# Patient Record
Sex: MALE | Race: WHITE | NOT HISPANIC OR LATINO | Employment: UNEMPLOYED | URBAN - METROPOLITAN AREA
[De-identification: names, ages, dates, MRNs, and addresses within clinical notes are randomized per-mention and may not be internally consistent; named-entity substitution may affect disease eponyms.]

---
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
## 2024-03-08 ENCOUNTER — APPOINTMENT (OUTPATIENT)
Dept: MRI IMAGING | Facility: CLINIC | Age: 24
End: 2024-03-08
Attending: EMERGENCY MEDICINE
Payer: MEDICAID

## 2024-03-08 ENCOUNTER — HOSPITAL ENCOUNTER (EMERGENCY)
Facility: CLINIC | Age: 24
Discharge: HOME | End: 2024-03-08
Attending: EMERGENCY MEDICINE | Admitting: EMERGENCY MEDICINE
Payer: MEDICAID

## 2024-03-08 VITALS
DIASTOLIC BLOOD PRESSURE: 77 MMHG | SYSTOLIC BLOOD PRESSURE: 139 MMHG | RESPIRATION RATE: 17 BRPM | TEMPERATURE: 98.4 F | HEART RATE: 69 BPM

## 2024-03-08 DIAGNOSIS — R20.2 PARESTHESIAS: ICD-10-CM

## 2024-03-08 LAB
ANION GAP SERPL CALCULATED.3IONS-SCNC: 10 MMOL/L (ref 7–15)
BUN SERPL-MCNC: 13.1 MG/DL (ref 6–20)
CALCIUM SERPL-MCNC: 9.5 MG/DL (ref 8.6–10)
CHLORIDE SERPL-SCNC: 105 MMOL/L (ref 98–107)
CREAT SERPL-MCNC: 1.01 MG/DL (ref 0.67–1.17)
DEPRECATED HCO3 PLAS-SCNC: 26 MMOL/L (ref 22–29)
EGFRCR SERPLBLD CKD-EPI 2021: >90 ML/MIN/1.73M2
GLUCOSE SERPL-MCNC: 111 MG/DL (ref 70–99)
POTASSIUM SERPL-SCNC: 3.9 MMOL/L (ref 3.4–5.3)
SODIUM SERPL-SCNC: 141 MMOL/L (ref 135–145)

## 2024-03-08 PROCEDURE — 99285 EMERGENCY DEPT VISIT HI MDM: CPT | Mod: 25 | Performed by: EMERGENCY MEDICINE

## 2024-03-08 PROCEDURE — A9585 GADOBUTROL INJECTION: HCPCS | Performed by: EMERGENCY MEDICINE

## 2024-03-08 PROCEDURE — 255N000002 HC RX 255 OP 636: Performed by: EMERGENCY MEDICINE

## 2024-03-08 PROCEDURE — 70549 MR ANGIOGRAPH NECK W/O&W/DYE: CPT

## 2024-03-08 PROCEDURE — 36415 COLL VENOUS BLD VENIPUNCTURE: CPT | Performed by: EMERGENCY MEDICINE

## 2024-03-08 PROCEDURE — 99284 EMERGENCY DEPT VISIT MOD MDM: CPT | Performed by: EMERGENCY MEDICINE

## 2024-03-08 PROCEDURE — 72156 MRI NECK SPINE W/O & W/DYE: CPT

## 2024-03-08 PROCEDURE — 72156 MRI NECK SPINE W/O & W/DYE: CPT | Mod: 26 | Performed by: RADIOLOGY

## 2024-03-08 PROCEDURE — 80048 BASIC METABOLIC PNL TOTAL CA: CPT | Performed by: EMERGENCY MEDICINE

## 2024-03-08 PROCEDURE — 70546 MR ANGIOGRAPH HEAD W/O&W/DYE: CPT

## 2024-03-08 PROCEDURE — 70549 MR ANGIOGRAPH NECK W/O&W/DYE: CPT | Mod: 26 | Performed by: RADIOLOGY

## 2024-03-08 RX ORDER — GADOBUTROL 604.72 MG/ML
7 INJECTION INTRAVENOUS ONCE
Status: COMPLETED | OUTPATIENT
Start: 2024-03-08 | End: 2024-03-08

## 2024-03-08 RX ORDER — IBUPROFEN 600 MG/1
600 TABLET, FILM COATED ORAL EVERY 6 HOURS PRN
Qty: 20 TABLET | Refills: 0 | Status: SHIPPED | OUTPATIENT
Start: 2024-03-08 | End: 2024-05-14

## 2024-03-08 RX ADMIN — GADOBUTROL 7 ML: 604.72 INJECTION INTRAVENOUS at 03:42

## 2024-03-08 ASSESSMENT — COLUMBIA-SUICIDE SEVERITY RATING SCALE - C-SSRS
2. HAVE YOU ACTUALLY HAD ANY THOUGHTS OF KILLING YOURSELF IN THE PAST MONTH?: NO
1. IN THE PAST MONTH, HAVE YOU WISHED YOU WERE DEAD OR WISHED YOU COULD GO TO SLEEP AND NOT WAKE UP?: NO
6. HAVE YOU EVER DONE ANYTHING, STARTED TO DO ANYTHING, OR PREPARED TO DO ANYTHING TO END YOUR LIFE?: NO

## 2024-03-08 ASSESSMENT — ACTIVITIES OF DAILY LIVING (ADL)
ADLS_ACUITY_SCORE: 35

## 2024-03-08 NOTE — ED PROVIDER NOTES
"    [place] EMERGENCY DEPARTMENT ([facility])    3/08/24       ED PROVIDER NOTE   Vertical Triage A   History     Chief Complaint   Patient presents with    Neck Pain     The history is provided by the patient and medical records.     Mani Grossman is a 24 year old male with history of PTSD on medical marijuana, distant history of polysubstance use disorder (alcohol, prescription opioids, amphetamines, barbiturates, alcohol, psychedelics) with 3 years of sobriety in setting of incarceration from 1850-3523 who presents with complaints of numbness and tingling in the low back and left leg.  He states that 2 nights ago he manually \"cracked\" his neck by jarring his chin to the right suddenly.  He states that as his neck cracked he felt a \"pop,\" and suddenly felt a numbness and tingling sensation in his low back and radiating down his left leg.  No weakness.  No loss of bowel or bladder control.  He states that symptoms have waxed and waned since then.  He states he has a little bit of discomfort in the left side of his neck and he feels that there is a bulge there, but no back discomfort, no pain anywhere else.  No other trauma or injuries.  No fever or cough.  No chest discomfort.  No other acute complaints.    This part of the document was transcribed by Alyssa Carlos, Medical Scribe.      Past Medical History  Past Medical History:   Diagnosis Date    Anxiety     Depression     Drug abuse (H)     FAS (fetal alcohol syndrome)     Oppositional defiant disorder      History reviewed. No pertinent surgical history.  ibuprofen (ADVIL/MOTRIN) 600 MG tablet  buPROPion (WELLBUTRIN XL) 300 MG 24 hr tablet  FLUoxetine (PROZAC) 20 MG capsule  folic acid (FOLVITE) 1 MG tablet  multivitamin, therapeutic (THERA-VIT) TABS tablet  naltrexone (DEPADE/REVIA) 50 MG tablet  nicotine (NICODERM CQ) 21 MG/24HR 24 hr patch  thiamine (THIAMINE) 100 MG tablet      No Known Allergies  Family History  History reviewed. No pertinent " family history.  Social History   Social History     Tobacco Use    Smoking status: Never   Substance Use Topics    Alcohol use: Yes     Alcohol/week: 0.0 standard drinks of alcohol     Comment: varied alcohol use    Drug use: Yes     Types: LSD     Comment: last used 4/26/2020         A medically appropriate review of systems was performed with pertinent positives and negatives noted in the HPI, and all other systems negative.    Physical Exam   BP: 139/77  Pulse: 69  Temp: 98.4  F (36.9  C)  Resp: 17  Physical Exam  Constitutional:       General: He is not in acute distress.     Appearance: Normal appearance. He is not toxic-appearing.   HENT:      Head: Atraumatic.   Eyes:      General: No scleral icterus.     Conjunctiva/sclera: Conjunctivae normal.   Neck:     Cardiovascular:      Rate and Rhythm: Normal rate.      Heart sounds: Normal heart sounds.   Pulmonary:      Effort: Pulmonary effort is normal. No respiratory distress.      Breath sounds: Normal breath sounds.   Abdominal:      Palpations: Abdomen is soft.      Tenderness: There is no abdominal tenderness.   Musculoskeletal:         General: No deformity.      Cervical back: Neck supple.   Skin:     General: Skin is warm.   Neurological:      General: No focal deficit present.      Mental Status: He is alert and oriented to person, place, and time.      Cranial Nerves: No cranial nerve deficit.      Sensory: No sensory deficit.      Motor: No weakness.      Coordination: Coordination normal.           ED Course, Procedures, & Data     ED Course as of 03/08/24 0436   Fri Mar 08, 2024   0123 Case discussed with Radiology.     Procedures              Results for orders placed or performed during the hospital encounter of 03/08/24   MR Cervical Spine w/o & w Contrast     Status: None    Narrative    EXAM: MR CERVICAL SPINE W/O and W CONTRAST  LOCATION: [facility]  DATE: 3/8/2024    INDICATION: cracked neck and  now having numbness tingling down left back and left leg  COMPARISON: None.  CONTRAST: 7ml gadavist  TECHNIQUE: MRI Cervical Spine without and with IV contrast.    FINDINGS:   Normal vertebral body heights, alignment and marrow signal. No abnormal cord signal. No extraspinal abnormality.    Craniovertebral junction and C1-C2: Normal.    C2-C3 through C7-T1: Normal disc height. No herniation. Normal facets. No spinal canal or neural foraminal stenosis.       Impression    IMPRESSION:  1.  Unremarkable cervical spine MRI.     MRA Neck (Carotids) w/o & w Contrast     Status: None    Narrative    EXAM: MRA NECK (CAROTIDS) W/O and W CONTRAST  LOCATION: [facility]  DATE: 3/8/2024    INDICATION: left back and leg numbness tingling after cracking neck   please include dissection protocol  COMPARISON: None.  CONTRAST: 7ml gadavist  TECHNIQUE: Neck MRA without and with IV contrast. Stenosis measurements made according to NASCET criteria unless otherwise specified.    FINDINGS:  RIGHT CAROTID: No measurable stenosis or dissection.    LEFT CAROTID: No measurable stenosis or dissection.    VERTEBRAL ARTERIES: No focal stenosis or dissection. Balanced vertebral arteries.    AORTIC ARCH: Classic aortic arch anatomy with no significant stenosis at the origin of the great vessels.      Impression    IMPRESSION:  1.  Normal neck MRA.   MRA Brain ([place] of [place]) wo & w Contrast     Status: None    Narrative    EXAM: MRA BRAIN ([place] OF [place]) W/O and W CONTRAST  LOCATION: [facility]  DATE: 3/8/2024    INDICATION: left back and leg numbness tingling after cracking neck  COMPARISON: None.  TECHNIQUE: 3D time-of-flight head MRA without intravenous contrast.    FINDINGS:  ANTERIOR CIRCULATION: No stenosis/occlusion, aneurysm, or high flow vascular malformation. Standard [place] of [place] anatomy.    POSTERIOR CIRCULATION: No stenosis/occlusion,  aneurysm, or high flow vascular malformation. Dominant left and smaller right vertebral artery contribute to a normal basilar artery.       Impression    IMPRESSION:  1.  No aneurysm, high flow AVM or significant stenosis identified.   Basic metabolic panel     Status: Abnormal   Result Value Ref Range    Sodium 141 135 - 145 mmol/L    Potassium 3.9 3.4 - 5.3 mmol/L    Chloride 105 98 - 107 mmol/L    Carbon Dioxide (CO2) 26 22 - 29 mmol/L    Anion Gap 10 7 - 15 mmol/L    Urea Nitrogen 13.1 6.0 - 20.0 mg/dL    Creatinine 1.01 0.67 - 1.17 mg/dL    GFR Estimate >90 >60 mL/min/1.73m2    Calcium 9.5 8.6 - 10.0 mg/dL    Glucose 111 (H) 70 - 99 mg/dL     Medications   gadobutrol (GADAVIST) injection 7 mL (7 mLs Intravenous $Given 3/8/24 6879)     Labs Ordered and Resulted from Time of ED Arrival to Time of ED Departure   BASIC METABOLIC PANEL - Abnormal       Result Value    Sodium 141      Potassium 3.9      Chloride 105      Carbon Dioxide (CO2) 26      Anion Gap 10      Urea Nitrogen 13.1      Creatinine 1.01      GFR Estimate >90      Calcium 9.5      Glucose 111 (*)      MRA Neck (Carotids) w/o & w Contrast   Final Result   IMPRESSION:   1.  Normal neck MRA.      MR Cervical Spine w/o & w Contrast   Final Result   IMPRESSION:   1.  Unremarkable cervical spine MRI.         MRA Brain ([place]) wo & w Contrast   Final Result   IMPRESSION:   1.  No aneurysm, high flow AVM or significant stenosis identified.             Critical care was not performed.     Medical Decision Making  The patient's presentation was of moderate complexity (an acute illness with systemic symptoms).    The patient's evaluation involved:  review of external note(s) from 1 sources (previous note)  ordering and/or review of 3+ test(s) in this encounter (see separate area of note for details)    The patient's management necessitated moderate risk (prescription drug management including medications given in the ED).    Assessment & Plan     The patient presented with sudden onset numbness and tingling after manually cracking his neck a couple of nights ago.  His exam is completely unremarkable-reported normal and equal sensation bilaterally with testing, strength was normal and equal as well.  I did speak with the radiologist who felt that we could use MRI to both evaluate the spinal cord as well as the cervical vessels.  No sign of dissection, no sign of any cord injury or issue.  He has no midline tenderness, and I do not suspect fracture.  He is tender on the left side in the paraspinal musculature, perhaps there are some slight fullness in the area.  I do think he has muscle spasm.  This could be contributing to his symptoms to some degree.  Symptoms are intermittent, he states that they go away at times, and seem positional.  I do not think he had a stroke.  BMP was not remarkable.  I do think he is safe for discharge home.  I do not think there is an acute neurologic or vascular emergency at this time.  No evidence for infectious etiology.  I will prescribe ibuprofen, ask him to follow-up with primary care next week.  He is encouraged to return with any new or worsening symptoms, any other concerns.  He verbalizes understanding and is agreeable to the plan.    Dictation Disclaimer: Some of this Note has been completed with voice-recognition dictation software. Although errors are generally corrected real-time, there is the potential for a rare error to be present in the completed chart.      I have reviewed the nursing notes. I have reviewed the findings, diagnosis, plan and need for follow up with the patient.    New Prescriptions    IBUPROFEN (ADVIL/MOTRIN) 600 MG TABLET    Take 1 tablet (600 mg) by mouth every 6 hours as needed for moderate pain       Final diagnoses:   Paresthesias       Laxmi Whitney MD   [facility] EMERGENCY DEPARTMENT  3/8/2024     Laxmi Whitney MD  03/08/24 9038

## 2024-03-08 NOTE — DISCHARGE INSTRUCTIONS
Use the ibuprofen as prescribed. Follow up with your clinic doctor next week. Return with any worsening or concerns.

## 2024-03-08 NOTE — ED TRIAGE NOTES
Pt came into the ER due to cracking his neck 2 days ago and he felt a pop. The lower back and left leg started to have positional numbness.  Pt denies vision changes and headache.

## 2024-03-28 ENCOUNTER — HOSPITAL ENCOUNTER (EMERGENCY)
Facility: CLINIC | Age: 24
Discharge: LEFT BEFORE DISCHARGE | End: 2024-03-28
Admitting: EMERGENCY MEDICINE
Payer: MEDICAID

## 2024-03-28 VITALS
HEART RATE: 160 BPM | OXYGEN SATURATION: 96 % | SYSTOLIC BLOOD PRESSURE: 124 MMHG | TEMPERATURE: 98.2 F | WEIGHT: 160 LBS | DIASTOLIC BLOOD PRESSURE: 101 MMHG | RESPIRATION RATE: 20 BRPM | BODY MASS INDEX: 22.96 KG/M2

## 2024-03-28 LAB
ATRIAL RATE - MUSE: 132 BPM
DIASTOLIC BLOOD PRESSURE - MUSE: NORMAL MMHG
INTERPRETATION ECG - MUSE: NORMAL
P AXIS - MUSE: 78 DEGREES
PR INTERVAL - MUSE: 144 MS
QRS DURATION - MUSE: 82 MS
QT - MUSE: 298 MS
QTC - MUSE: 441 MS
R AXIS - MUSE: 72 DEGREES
SYSTOLIC BLOOD PRESSURE - MUSE: NORMAL MMHG
T AXIS - MUSE: 71 DEGREES
VENTRICULAR RATE- MUSE: 132 BPM

## 2024-03-28 PROCEDURE — 93005 ELECTROCARDIOGRAM TRACING: CPT | Mod: RTG

## 2024-03-28 PROCEDURE — 99281 EMR DPT VST MAYX REQ PHY/QHP: CPT

## 2024-03-28 ASSESSMENT — COLUMBIA-SUICIDE SEVERITY RATING SCALE - C-SSRS
2. HAVE YOU ACTUALLY HAD ANY THOUGHTS OF KILLING YOURSELF IN THE PAST MONTH?: NO
6. HAVE YOU EVER DONE ANYTHING, STARTED TO DO ANYTHING, OR PREPARED TO DO ANYTHING TO END YOUR LIFE?: NO
1. IN THE PAST MONTH, HAVE YOU WISHED YOU WERE DEAD OR WISHED YOU COULD GO TO SLEEP AND NOT WAKE UP?: NO

## 2024-03-28 NOTE — ED TRIAGE NOTES
"Pt c/o numbness to L arm. Pt states he had an IV 2 weeks ago and he now has \"blood shooting through his arm\" and that it feels like it goes from where the IV was placed to his finger tips. Pt is continuously moving arm around and shaking it and reports it feels better when he does this. Pt endorses taking meth tonight. Pt movements are fast and animated.    Pt had MRI done on 3/8 for numbness to L side.              Triage Assessment (Adult)       Row Name 03/28/24 0059          Triage Assessment    Airway WDL WDL        Respiratory WDL    Respiratory WDL WDL        Cardiac WDL    Pulse Rate & Regularity tachycardic        Cognitive/Neuro/Behavioral WDL    Cognitive/Neuro/Behavioral WDL WDL                     "

## 2024-04-16 ENCOUNTER — HOSPITAL ENCOUNTER (EMERGENCY)
Facility: CLINIC | Age: 24
Discharge: HOME | End: 2024-04-16
Attending: EMERGENCY MEDICINE | Admitting: EMERGENCY MEDICINE
Payer: MEDICAID

## 2024-04-16 VITALS
RESPIRATION RATE: 18 BRPM | WEIGHT: 160 LBS | HEART RATE: 93 BPM | TEMPERATURE: 97.2 F | BODY MASS INDEX: 22.4 KG/M2 | DIASTOLIC BLOOD PRESSURE: 71 MMHG | SYSTOLIC BLOOD PRESSURE: 120 MMHG | HEIGHT: 71 IN

## 2024-04-16 DIAGNOSIS — F41.9 ANXIETY: ICD-10-CM

## 2024-04-16 PROCEDURE — 99283 EMERGENCY DEPT VISIT LOW MDM: CPT | Performed by: EMERGENCY MEDICINE

## 2024-04-16 PROCEDURE — 99281 EMR DPT VST MAYX REQ PHY/QHP: CPT | Performed by: EMERGENCY MEDICINE

## 2024-04-16 ASSESSMENT — COLUMBIA-SUICIDE SEVERITY RATING SCALE - C-SSRS
1. IN THE PAST MONTH, HAVE YOU WISHED YOU WERE DEAD OR WISHED YOU COULD GO TO SLEEP AND NOT WAKE UP?: NO
6. HAVE YOU EVER DONE ANYTHING, STARTED TO DO ANYTHING, OR PREPARED TO DO ANYTHING TO END YOUR LIFE?: NO
2. HAVE YOU ACTUALLY HAD ANY THOUGHTS OF KILLING YOURSELF IN THE PAST MONTH?: NO
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
## 2024-04-16 NOTE — DISCHARGE INSTRUCTIONS
Please make an appointment to follow up with [facility] (phone: [phone])  if you have any concerns.
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
## 2024-05-03 ENCOUNTER — HOSPITAL ENCOUNTER (EMERGENCY)
Facility: CLINIC | Age: 24
Discharge: HOME | End: 2024-05-03
Attending: EMERGENCY MEDICINE | Admitting: EMERGENCY MEDICINE
Payer: MEDICAID

## 2024-05-03 DIAGNOSIS — K13.0 MUCOCELE OF LIP: ICD-10-CM

## 2024-05-03 PROCEDURE — 99283 EMERGENCY DEPT VISIT LOW MDM: CPT | Performed by: EMERGENCY MEDICINE

## 2024-05-03 PROCEDURE — 99282 EMERGENCY DEPT VISIT SF MDM: CPT | Performed by: EMERGENCY MEDICINE

## 2024-05-03 NOTE — ED TRIAGE NOTES
Triage Assessment (Adult)       Row Name 05/03/24 0428          Triage Assessment    Airway WDL WDL        Respiratory WDL    Respiratory WDL WDL        Skin Circulation/Temperature WDL    Skin Circulation/Temperature WDL WDL        Cardiac WDL    Cardiac WDL WDL        Peripheral/Neurovascular WDL    Peripheral Neurovascular WDL WDL        Cognitive/Neuro/Behavioral WDL    Cognitive/Neuro/Behavioral WDL WDL

## 2024-05-03 NOTE — ED TRIAGE NOTES
BIBA, Per EMS, Pt has a sore in the inside of its lip and has been anxious regarding it. Pt has hx of depression, anxiety.

## 2024-05-03 NOTE — ED PROVIDER NOTES
ED Provider Note  [facility]      History     Chief Complaint   Patient presents with    Anxiety    Sore     HPI  Mani Grossman is a 24 year old male who has medical history of anxiety presenting with a lesion on his lip.  Noticed it tonight.  Patient denies ever having this before.  It does not really hurt but it is bothering him.  No fevers.  No vomiting.  He is eating and drinking normally.  No swelling of his tongue, throat.    Past Medical History  Past Medical History:   Diagnosis Date    Anxiety     Depression     Drug abuse (H)     FAS (fetal alcohol syndrome)     Oppositional defiant disorder      History reviewed. No pertinent surgical history.  buPROPion (WELLBUTRIN XL) 300 MG 24 hr tablet  FLUoxetine (PROZAC) 20 MG capsule  folic acid (FOLVITE) 1 MG tablet  ibuprofen (ADVIL/MOTRIN) 600 MG tablet  multivitamin, therapeutic (THERA-VIT) TABS tablet  naltrexone (DEPADE/REVIA) 50 MG tablet  nicotine (NICODERM CQ) 21 MG/24HR 24 hr patch  thiamine (THIAMINE) 100 MG tablet      No Known Allergies  Family History  History reviewed. No pertinent family history.  Social History   Social History     Tobacco Use    Smoking status: Never   Substance Use Topics    Alcohol use: Yes     Alcohol/week: 0.0 standard drinks of alcohol     Comment: varied alcohol use    Drug use: Yes     Types: LSD     Comment: last used 4/26/2020         A medically appropriate review of systems was performed with pertinent positives and negatives noted in the HPI, and all other systems negative.    Physical Exam      Physical Exam  Physical Exam   Constitutional: oriented to person, place, and time. appears well-developed and well-nourished.   HENT:   Head: Normocephalic and atraumatic.  There is a 2 mm domed cystic structure on the mucosal surface of the lower lip with apparent clear fluid, no surrounding erythema.  No submandibular swelling.  No significant erythema.  Tongue is not elevated.  Posterior pharynx  normal.  Neck: Normal range of motion.   Pulmonary/Chest: Effort normal. No respiratory distress.   Cardiac: No murmurs, rubs, gallops. RRR.  Abdominal: Abdomen soft, nontender, nondistended. No rebound tenderness.  MSK: Long bones without deformity or evidence of trauma  Neurological: alert and oriented to person, place, and time.   Skin: Skin is warm and dry.   Psychiatric:  normal mood and affect.  behavior is normal. Thought content normal.       ED Course, Procedures, & Data      Procedures            No results found for any visits on 05/03/24.  Medications - No data to display  Labs Ordered and Resulted from Time of ED Arrival to Time of ED Departure - No data to display  No orders to display          Critical care was not performed.     Medical Decision Making  The patient's presentation was of low complexity (2+ clearly self-limited or minor problems).    The patient's evaluation involved:  history and exam without other [facility] data elements    The patient's management necessitated moderate risk (limitations due to social determinants of health (mental health concerns)).    Assessment & Plan    MDM  Patient presenting with lesion on his lower lip.  This is likely a mucocele or a canker sore.  No signs of infection.  The patient was adamant that I remove it.  I discussed that I do not feel it is necessary to cause any more trauma to the lower lip as this will probably cause him more discomfort.  Discussed that he can see a dentist or primary care provider for further help she did not go away however this is quite benign and likely will rupture on its own.  Patient does seem quite worked up about this and is not accepting of the benign nature.  I did offer him mental health help but he declined this.  The patient will be discharged.  Tried to reassure the patient that this is quite benign and that it would likely be gone in a week.  He knows he can return if he has any further concerns.    I have reviewed the  nursing notes. I have reviewed the findings, diagnosis, plan and need for follow up with the patient.    New Prescriptions    No medications on file       Final diagnoses:   Mucocele of lip       Harley Loera  [facility] EMERGENCY DEPARTMENT  5/3/2024     Harley Loera MD  05/03/24 0448

## 2024-05-03 NOTE — DISCHARGE INSTRUCTIONS
You have a benign lesion in your mouth.  This will likely pop and go away on its own.  If it does not he can follow-up with primary care provider they can potentially freeze it or you can see a dentist for removal    Please make an appointment to follow up with [facility] (phone: [phone]) and [facility] (phone: [phone])  if you have any concerns.

## 2024-05-13 ENCOUNTER — HOSPITAL ENCOUNTER (OUTPATIENT)
Facility: CLINIC | Age: 24
Setting detail: OBSERVATION
Discharge: HOME | End: 2024-05-14
Attending: EMERGENCY MEDICINE | Admitting: STUDENT IN AN ORGANIZED HEALTH CARE EDUCATION/TRAINING PROGRAM
Payer: MEDICAID

## 2024-05-13 DIAGNOSIS — F22 PARANOIA (H): ICD-10-CM

## 2024-05-13 DIAGNOSIS — F15.20 METHAMPHETAMINE DEPENDENCE (H): ICD-10-CM

## 2024-05-13 DIAGNOSIS — F15.259: Primary | ICD-10-CM

## 2024-05-13 PROCEDURE — 99223 1ST HOSP IP/OBS HIGH 75: CPT | Performed by: STUDENT IN AN ORGANIZED HEALTH CARE EDUCATION/TRAINING PROGRAM

## 2024-05-13 PROCEDURE — 99285 EMERGENCY DEPT VISIT HI MDM: CPT | Mod: 25 | Performed by: STUDENT IN AN ORGANIZED HEALTH CARE EDUCATION/TRAINING PROGRAM

## 2024-05-13 PROCEDURE — 250N000013 HC RX MED GY IP 250 OP 250 PS 637: Performed by: EMERGENCY MEDICINE

## 2024-05-13 PROCEDURE — G0378 HOSPITAL OBSERVATION PER HR: HCPCS

## 2024-05-13 RX ORDER — OLANZAPINE 10 MG/1
10 TABLET, ORALLY DISINTEGRATING ORAL ONCE
Status: COMPLETED | OUTPATIENT
Start: 2024-05-13 | End: 2024-05-13

## 2024-05-13 RX ADMIN — OLANZAPINE 10 MG: 10 TABLET, ORALLY DISINTEGRATING ORAL at 16:59

## 2024-05-13 ASSESSMENT — ACTIVITIES OF DAILY LIVING (ADL)
ADLS_ACUITY_SCORE: 35

## 2024-05-13 NOTE — ED TRIAGE NOTES
Triage Assessment (Adult)       Row Name 05/13/24 3857          Triage Assessment    Airway WDL WDL        Respiratory WDL    Respiratory WDL WDL        Skin Circulation/Temperature WDL    Skin Circulation/Temperature WDL WDL        Cardiac WDL    Cardiac WDL WDL        Peripheral/Neurovascular WDL    Peripheral Neurovascular WDL WDL        Cognitive/Neuro/Behavioral WDL    Cognitive/Neuro/Behavioral WDL WDL
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
## 2024-05-14 VITALS
OXYGEN SATURATION: 97 % | SYSTOLIC BLOOD PRESSURE: 120 MMHG | TEMPERATURE: 97.6 F | RESPIRATION RATE: 18 BRPM | DIASTOLIC BLOOD PRESSURE: 70 MMHG | HEART RATE: 77 BPM

## 2024-05-14 PROCEDURE — G0378 HOSPITAL OBSERVATION PER HR: HCPCS

## 2024-05-14 PROCEDURE — 99254 IP/OBS CNSLTJ NEW/EST MOD 60: CPT

## 2024-05-14 PROCEDURE — 99238 HOSP IP/OBS DSCHRG MGMT 30/<: CPT | Performed by: EMERGENCY MEDICINE

## 2024-05-14 RX ORDER — OLANZAPINE 5 MG/1
5 TABLET ORAL AT BEDTIME
COMMUNITY

## 2024-05-14 RX ORDER — OLANZAPINE 5 MG/1
5 TABLET ORAL AT BEDTIME
Qty: 30 TABLET | Refills: 0 | Status: SHIPPED | OUTPATIENT
Start: 2024-05-14

## 2024-05-14 ASSESSMENT — ACTIVITIES OF DAILY LIVING (ADL)
ADLS_ACUITY_SCORE: 35

## 2024-05-14 ASSESSMENT — COLUMBIA-SUICIDE SEVERITY RATING SCALE - C-SSRS
SUICIDE, SINCE LAST CONTACT: NO
TOTAL  NUMBER OF ABORTED OR SELF INTERRUPTED ATTEMPTS SINCE LAST CONTACT: NO
6. HAVE YOU EVER DONE ANYTHING, STARTED TO DO ANYTHING, OR PREPARED TO DO ANYTHING TO END YOUR LIFE?: NO
ATTEMPT SINCE LAST CONTACT: NO
TOTAL  NUMBER OF INTERRUPTED ATTEMPTS SINCE LAST CONTACT: NO
2. HAVE YOU ACTUALLY HAD ANY THOUGHTS OF KILLING YOURSELF?: NO
1. SINCE LAST CONTACT, HAVE YOU WISHED YOU WERE DEAD OR WISHED YOU COULD GO TO SLEEP AND NOT WAKE UP?: NO

## 2024-05-14 NOTE — DISCHARGE SUMMARY
ED Observation Discharge Summary  [facility]  Discharge Date: 5/14/2024    Mani Grossman MRN: [id number]   Age: 24 year old YOB: 2000     Brief HPI & Initial ED Course     Chief Complaint   Patient presents with    Drug / Alcohol Assessment     Patient states he is seeking detox from Meth. Patient states he snorts it and sometimes eats it. Last use was Saturday and has been using it for 4-5 months.      HPI  Mani Grossman is a 24 year old male with PMH notable for polysubstance abuse, ODD and FAS who presented to the ED with altered mental status. Initial history was limited due to altered mental status. The patient arrived with altered mental status with reason to suspect alcohol or other drug intoxication as etiology, and an exam that was without findings suggestive of trauma. Pt has a hx of Methamphetamine abuse.     Upon being evaluated in the emergency department, the patient was found to have a condition that would benefit from ongoing monitoring. Observation care was initiated with plan for close clinical monitoring of the patient and his mental status for clearing of intoxicating substance, and broadening of the work-up if not clearing appropriately or if other indications develop.     See ED Observation H&P for further details on the patient's presenting history and initial evaluation.     Physical Exam   BP: 112/65  Pulse: 109  Temp: 98.6  F (37  C)  Resp: 18  SpO2: 98 %    Physical Exam  General: no acute distress. Alert.   HENT: MMM. Atraumatic head.   Eyes: PERRL, normal sclerae   Neck: non-tender, supple    Resp: Normal work of breathing, normal respiratory rate    Neuro: alert, fully oriented. Steady gait. CN II-XII grossly intact. Grossly normal strength and sensation.   Integumentary/Skin: no rash visualized, normal color    Results      Procedures                  Labs Ordered and Resulted from Time of ED Arrival to Time of ED Departure - No data to display          Observation Course   The patient was admitted to observation status with plan for close clinical monitoring of the patient and his mental status for clearing of intoxicating substance, and broadening of the work-up if not clearing appropriately or if other indications develop.     Serial assessments of the patient's mental status were performed. Nursing notes were reviewed. During the observation period, the patient did not require medications for agitation, and did not require restraints/seclusion for patient and/or provider safety.         With monitoring, patient's mental status cleared. Patient then clinically sober with steady gait and tolerating PO. Normalization of mental status with sobering makes other causes of altered mental status very unlikely.     After counseling on the diagnosis, work-up, and treatment plan, the patient was discharged. Recommended safe cessation of EtOH/drugs and provided information on community treatment resources. Patient to follow-up with primary care in the coming days for recheck and further cessation counseling. Patient to return to the ED if any urgent or potentially life-threatening concerns.    She was seen by both extended care and by the substance abuse navigator.  Patient has resources to follow-up [facility].  Patient was seen by the psychiatric NP as well.  No reason for needing inpatient admission at this time.  Patient stable for discharge    Discharge Diagnoses:   Final diagnoses:   Paranoia (H)   Methamphetamine dependence (H)       --  Cierra Payan MD  [facility] EMERGENCY DEPARTMENT  5/14/2024

## 2024-05-14 NOTE — H&P
ED Observation History and Physical  [facility]  Observation Initiation Date: May 13, 2024    Mani Grossman MRN: [id number]   Age: 24 year old YOB: 2000     History     Chief Complaint   Patient presents with    Drug / Alcohol Assessment     Patient states he is seeking detox from Meth. Patient states he snorts it and sometimes eats it. Last use was Saturday and has been using it for 4-5 months.      HPI  Mani Grossman is a 24 year old male with PMH notable for MARCELL, MDD, PTSD  who presented to the ED with disordered thinking.           Review of Systems      Physical Exam   BP: 112/65  Pulse: 109  Temp: 98.6  F (37  C)  Resp: 18  SpO2: 98 %    Physical Exam  General: . Appears stated age.   HENT: MMM, no oropharyngeal lesions  Eyes: PERRL, normal sclerae   Cardio: Regular rate, extremities well perfused  Resp: Normal work of breathing, normal respiratory rate  Neuro: alert and fully oriented. CN II-XII grossly intact. Grossly normal strength and sensation in all extremities.   MSK: no deformities.   Integumentary/Skin: no rash visualized, normal color    ED Course      Procedures                         Labs Ordered and Resulted from Time of ED Arrival to Time of ED Departure - No data to display         Assessments & Plan (with Medical Decision Making)   Patient presenting with disordered thinking. Nursing notes reviewed.     DEC assessment completed with  recommending obs overnight for psych assessment in am. See separate DEC note from today's date for details on the assessment.    During the observation period, the patient did not require medications for agitation, and did not require restraints/seclusion for patient and/or provider safety.     The patient was found to have a psychiatric condition that would benefit from an observation stay in the emergency department for further psychiatric stabilization and/or coordination of a safe disposition. The observation plan  includes serial assessments of psychiatric condition, potential administration of medications if indicated, further disposition pending the patient's psychiatric course during the monitoring period.     Preliminary diagnosis:  Disordered thinking    --  Dov Tavares MD   [facility] EMERGENCY DEPARTMENT  5/13/2024

## 2024-05-14 NOTE — CONSULTS
"Diagnostic Evaluation Consultation  Crisis Assessment    Patient Name: Mani Grossman  Age:  24 year old  Legal Sex: male  Gender Identity: male  Pronouns:   Race: White  Ethnicity: Not  or   Language: English      Patient was assessed: Virtual: iPad Crisis Assessment Start Time: 1730 Crisis Assessment Stop Time: 1800  Patient location: [facility] EMERGENCY DEPARTMENT                             [facility]    Referral Data and Chief Complaint  Mani Grossman presents to the ED with family/friends (pt brought by dad). Patient is presenting to the ED for the following concerns: Substance use, Paranoia.   Factors that make the mental health crisis life threatening or complex are:  Pt struggling with ongoing meth use and recent difficulties with paranoia.      Informed Consent and Assessment Methods  Explained the crisis assessment process, including applicable information disclosures and limits to confidentiality, assessed understanding of the process, and obtained consent to proceed with the assessment.  Assessment methods included conducting a formal interview with patient, review of medical records, collaboration with medical staff, and obtaining relevant collateral information from family and community providers when available.  : done     Patient response to interventions: eager to participate, acceptance expressed  Coping skills were attempted to reduce the crisis:  pt came into ED     History of the Crisis   Patient is a 24-year-old male with a history of PTSD, generalized anxiety disorder, agitation and methamphetamine abuse who comes in the hospital brought in by his father due to concerns of ongoing methamphetamine abuse and concerns of paranoia.  Patient reports that has been using meth for the last few months and is trying to get off of it.  He reports he has been dealing with \"some type of psychosis\" and increasing body discomfort.  He reports that his body is been feeling strange and is " "feeling distrustful of the world around him.  He reports that he \"does not feel like myself\" and is paranoid of his family and feels that someone might be following him but he is not sure who that might be.  He feels that paranoia is daily and makes it hard to do anything on a day-to-day basis.      He reports last using meth this past Saturday and often will use it daily.  He feels that he can tell people in front of him or actual people or whether they are visual hallucinations.  He also reports tactile hallucinations of feeling as though things are poking his skin. He's been having difficulty with organizing his thoughts and a recent rule 25 completed for CD treatment. He endorses feelings of high anxiety, nervous, feeling on edge, feelings of sadness, and wondering whether \"I can ever get better.\" He reports passive thoughts of SI in the past, but denies any plan or intent for self harm.    Brief Psychosocial History  Family:  Single, Children no  Support System:  Parent(s)  Employment Status:  unemployed  Source of Income:  none  Financial Environmental Concerns:  none  Current Hobbies:  other (see comments) (\"None right now\")  Barriers in Personal Life:       Significant Clinical History  Current Anxiety Symptoms:     Current Depression/Trauma:  sadness, hopelessness, helplessness  Current Somatic Symptoms:     Current Psychosis/Thought Disturbance:  tactile hallucinations, visual hallucinations  Current Eating Symptoms:     Chemical Use History:  Alcohol: None  Benzodiazepines: None  Opiates: None  Cocaine: None  Marijuana: Occasional  Other Use: Methamphetamines (using meth daily)  Last Use:: 05/11/24   Past diagnosis:  Anxiety Disorder, Substance Use Disorder  Family history:  No known history of mental health or chemical health concerns  Past treatment:  AA/NA, Psychiatric Medication Management, Individual therapy  Details of most recent treatment:  Pt reports around six previous CD treatments, with last " treatment around August 2023.  Other relevant history:          Collateral Information  Is there collateral information: No     Collateral information name, relationship, phone number:  Arthur Grossman, Father,  [phone] (attempted to contact father, left VM for him to call back)    What happened today: Pt has been acting more paranoid lately feeling that others are out to get him and trying to hurt him. Dad notes they were not aware the pt was abusing meth up until yesterday. Dad notes the pt has been acting more erratic lately where the pt went to help his sister move things out of her garage and showed up using a hammer and an ice car . Dad does not feel he is mentally stable and while he does wish to get help, pt elected to come to the hospital to get well before he feels comfortable gonig to treatment.     What is different about patient's functioning: Pt has been acting more paranoid and disorganized thinking.     Concern about alcohol/drug use:  yes, ongoing meth use    What do you think the patient needs:      Has patient made comments about wanting to kill themselves/others: yes (he's said in the past that he would be better off dead)    If d/c is recommended, can they take part in safety/aftercare planning:  no    Additional collateral information:        Risk Assessment  [place] Suicide Severity Rating Scale Full Clinical Version:  Suicidal Ideation  Q1 Wish to be Dead (Lifetime): Yes  Q2 Non-Specific Active Suicidal Thoughts (Lifetime): No  3. Active Suicidal Ideation with any Methods (Not Plan) Without Intent to Act (Lifetime): No  Q4 Active Suicidal Ideation with Some Intent to Act, Without Specific Plan (Lifetime): No  Q5 Active Suicidal Ideation with Specific Plan and Intent (Lifetime): No  Q6 Suicide Behavior (Lifetime): no     Suicidal Behavior (Lifetime)  Actual Attempt (Lifetime): No  Has subject engaged in non-suicidal self-injurious behavior? (Lifetime): No  Interrupted Attempts  (Lifetime): No  Aborted or Self-Interrupted Attempt (Lifetime): No  Preparatory Acts or Behavior (Lifetime): No    [place] Suicide Severity Rating Scale Recent:   Suicidal Ideation (Recent)  Q1 Wished to be Dead (Past Month): no  Q2 Suicidal Thoughts (Past Month): no  Level of Risk per Screen: no risks indicated  Intensity of Ideation (Recent)  Most Severe Ideation Rating (Past 1 Month): 1  Frequency (Past 1 Month): Less than once a week  Duration (Past 1 Month): Fleeting, few seconds or minutes  Controllability (Past 1 Month): Easily able to control thoughts  Deterrents (Past 1 Month): Deterrents definitely did not stop you  Reasons for Ideation (Past 1 Month): Does not apply  Suicidal Behavior (Recent)  Actual Attempt (Past 3 Months): No  Has subject engaged in non-suicidal self-injurious behavior? (Past 3 Months): No  Interrupted Attempts (Past 3 Months): No  Aborted or Self-Interrupted Attempt (Past 3 Months): No  Preparatory Acts or Behavior (Past 3 Months): No    Environmental or Psychosocial Events: unemployment/underemployment, ongoing abuse of substances, neither working nor attending school  Protective Factors: Protective Factors: lives in a responsibly safe and stable environment, help seeking    Does the patient have thoughts of harming others? Feels Like Hurting Others: no  Previous Attempt to Hurt Others: no  Is the patient engaging in sexually inappropriate behavior?: no    Is the patient engaging in sexually inappropriate behavior?  no        Mental Status Exam   Affect: Appropriate  Appearance: Appropriate  Attention Span/Concentration: Attentive  Eye Contact: Engaged    Fund of Knowledge: Appropriate   Language /Speech Content: Fluent  Language /Speech Volume: Normal  Language /Speech Rate/Productions: Normal  Recent Memory: Intact  Remote Memory: Intact  Mood: Anxious  Orientation to Person: Yes   Orientation to Place: Yes  Orientation to Time of Day: Yes  Orientation to Date: Yes     Situation  (Do they understand why they are here?): Yes  Psychomotor Behavior: Normal  Thought Content: Paranoia, Hallucinations  Thought Form: Intact     Mini-Cog Assessment  Number of Words Recalled:    Clock-Drawing Test:     Three Item Recall:    Mini-Cog Total Score:       Medication  Psychotropic medications:   Medication Orders - Psychiatric (From admission, onward)      None             Current Care Team  Patient Care Team:  Hilaria Weeks NP as PCP - General (Nurse Practitioner)  Juan Judd MD as MD (Pediatric Cardiology)    Diagnosis  Patient Active Problem List   Diagnosis Code    Agitation requiring sedation protocol R45.1    Agitation R45.1    Overdose of sympathomimetic agent, undetermined intent, initial encounter T44.904A    Suicidal ideation R45.851    Alcohol withdrawal syndrome without complication (H) F10.930    Fetal alcohol spectrum disorder (H28) P04.3    PTSD (post-traumatic stress disorder) F43.10    Unspecified alcohol use disorder F10.99    Unspecified nicotine use disorder F17.200    Major depressive disorder, recurrent episode, moderate (H) F33.1    MARCELL (generalized anxiety disorder) F41.1    Cannabis use disorder, unspecified F12.90    Paranoia (H) F22    Methamphetamine dependence (H) F15.20    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  Patient admits to ongoing meth use that has been quite consistent over the last few months which has led to the patient experiencing increasing paranoia and difficulty understanding reality.  Patient last used meth 2 days ago and shares reports about having paranoia and fears around dandelions  chasing him and general paranoia/fear around what other people's intensions are. He feels that he is unable to determine whether people in front of him are actual people or whether he is hallucinating. He's also been struggling with tactile halluciations which has resulted in him with severe skin picking. Pt recently completed a rule 25 with the hopes to getting into [facility] for treatment. Pt initially met with Dr. Tirado who discussed pt remaining in the hospital overnight for observation, starting Zyprexa medication to bring mental clearing, with hopes of being able to DC tomorrow to attend CD treatment. Pt is in agreement with current plan and will meet with extended care in the morning on 5/14 to determine pt's readiness to discharge to [facility].          Patient coping skills attempted to reduce the crisis:  pt came into ED    Disposition  Recommended disposition: Observation        Reviewed case and recommendations with attending provider. Attending Name: Dr. Tavares       Attending concurs with disposition: yes       Patient and/or validated legal guardian concurs with disposition:   yes       Final disposition:  observation    Legal status on admission:      Assessment Details   Total duration spent with the patient: 30 min     CPT code(s) utilized: 25255 - Psychotherapy for Crisis - 60 (30-74*) min    Kendall Chisholm Psychotherapist  [facility] - Triage & Transition Services  Callback: [phone]

## 2024-05-14 NOTE — MEDICATION SCRIBE - ADMISSION MEDICATION HISTORY
Medication Scribe Admission Medication History    Admission medication history is complete. The information provided in this note is only as accurate as the sources available at the time of the update.    Information Source(s): Patient, Hospital records, and CareEverywhere/SureScripts via in-person    Pertinent Information: patient reports a recent prescription for olanzapine was filled but he has not started taking it, he reports he does not have a pharmacy..    Changes made to PTA medication list:  Added: olanzapine 5 mg at bedtime.  Deleted: bupropion 300 mg Q am, fluoxetine 20 mg daily, folic acid 1 mg daily, ibuprofen 600 mg Q 6 hrs PRN, MVI 1 tablet daily, naltrexone 50 mg daily, nicotine 21 mg/24 hr, 1 patch daily, thiamine 100 mg daily, gabapentin 300 mg.  Changed: None    Allergies reviewed with patient and updates made in EHR: yes    Medication History Completed By: Jonatan Singh MD 5/14/2024 11:44 AM    PTA Med List   Medication Sig Last Dose    OLANZapine (ZYPREXA) 5 MG tablet Take 1 tablet (5 mg) by mouth at bedtime     OLANZapine (ZYPREXA) 5 MG tablet Take 5 mg by mouth at bedtime Unknown at has not started

## 2024-05-14 NOTE — CONSULTS
Met with patient to discuss possible treatment options and to possibly complete an assessment. Patient states he is planning on going to [facility] and has recently completed an assessment and has everything set up.  Patient declined any assistance from this staff related to [facility] services or assistance.    Maykel Tirado [facility] on 5/14/2024 at 9:19 AM

## 2024-05-14 NOTE — PROGRESS NOTES
"Triage and Transition Services Extended Care Reassessment     Patient: Mani goes by \"Mani,\" uses he/him pronouns  Date of Service: May 14, 2024  Site of Service: [facility] EMERGENCY DEPARTMENT                             [facility]  Patient was seen yes  Mode of Assessment: Virtual: iPad     Reason for Reassessment: significant behavior change    History of Patient's Original Emergency Room Encounter: Patient is a 24-year-old male with a history of PTSD, generalized anxiety disorder, agitation and methamphetamine abuse who comes in the hospital brought in by his father due to concerns of ongoing methamphetamine abuse and concerns of paranoia.  Patient reports that has been using meth for the last few months and is trying to get off of it.  He reports he has been dealing with \"some type of psychosis\" and increasing body discomfort.  He reports that his body is been feeling strange and is feeling distrustful of the world around him.  He reports that he \"does not feel like myself\" and is paranoid of his family and feels that someone might be following him but he is not sure who that might be.  He feels that paranoia is daily and makes it hard to do anything on a day-to-day basis.  He reports last using meth this past Saturday and often will use it daily.  He feels that he can tell people in front of him or actual people or whether they are visual hallucinations.  He also reports tactile hallucinations of feeling as though things are poking his skin. He's been having difficulty with organizing his thoughts and a recent rule 25 completed for CD treatment. He endorses feelings of high anxiety, nervous, feeling on edge, feelings of sadness, and wondering whether \"I can ever get better.\" He reports passive thoughts of SI in the past, but denies any plan or intent for self harm.    Current Patient Presentation: Writer met Mr. Grossman today. He denies SI, HI, NSSIB or panchito. Notes \"I don't know\" when asked about " "symptoms fo psychosis. He is minimal in his response noting high fatigue, and could be associated with substance withdrawal. He is open to discussion about admission to [facility] door to door if possible. He is also okay with discharge home if needed if door to door cannot be arranged due to availability. Preference for location of [place] or [facility].    Presentation Summary: Writer met Mr. Grossman today. He denies SI, HI, NSSIB or panchito. Notes \"I don't know\" when asked about symptoms fo psychosis. He is minimal in his response noting high fatigue, and could be associated with substance withdrawal. He is open to discussion about admission to [facility] door to door if possible. He is also okay with discharge home if needed if door to door cannot be arranged due to availability. Preference for location of [facility].    Changes Observed Since Initial Assessment: decrease in presenting symptoms    Therapeutic Interventions Provided: Engaged in safety planning, Taught the link between thoughts, feelings, and behaviors., Explored motivation for behavioral change.    Current Symptoms:    (sleeping/fatigue)          Mental Status Exam   Affect: Appropriate  Appearance: Appropriate  Attention Span/Concentration: Attentive  Eye Contact: Engaged    Fund of Knowledge: Appropriate   Language /Speech Content: Fluent  Language /Speech Volume: Soft  Language /Speech Rate/Productions: Minimally Responsive  Recent Memory: Intact  Remote Memory: Intact  Mood: Normal  Orientation to Person: Yes   Orientation to Place: Yes  Orientation to Time of Day: Yes  Orientation to Date: Yes     Situation (Do they understand why they are here?): Yes  Psychomotor Behavior: Normal  Thought Content: Clear  Thought Form: Intact    Treatment Objective(s) Addressed: rapport building, identifying and practicing coping strategies, processing feelings, assessing safety, identifying treatment goals, identifying additional supports    Patient Response to " Interventions: acceptance expressed, verbalizes understanding    Progress Towards Goals:  Patient Reports Symptoms Are: stable  Patient Progress Toward Goals: is making progress (stable)  Next Step to Work Toward Discharge: symptom stabilization, follow up on referrals  Symptom Stabilization Comment: [facility] [phone]  Symptom Stabilization Comment: Psychiatry recommended medication for treatment in OP setting    Case Management: Case Management Included: completing or following up on referrals  Details on completing or following up on referrals: JOE Schwartz [phone] (- Kyleigh)  Summary of Interaction: Writer spoke to Kyleigh whom reports the intake interview needs to be completed and additional information is needed and that door to door will not be likely, however patient would be able to follow up and start care after speaking to her further withing a few days to a week.    C-SSRS Since Last Contact:   1. Wish to be Dead (Since Last Contact): No  2. Non-Specific Active Suicidal Thoughts (Since Last Contact): No     Actual Attempt (Since Last Contact): No  Has subject engaged in non-suicidal self-injurious behavior? (Since Last Contact): No  Interrupted Attempts (Since Last Contact): No  Aborted or Self-Interrupted Attempt (Since Last Contact): No  Preparatory Acts or Behavior (Since Last Contact): No  Suicide (Since Last Contact): No     Calculated C-SSRS Risk Score (Since Last Contact): No Risk Indicated    Plan: Final Disposition / Recommended Care Path: discharge  Plan for Care reviewed with assigned Medical Provider: yes  Plan for Care Team Review: provider, RN, other (see comment) (Psychiatry and [facility])  Comments: Dr. Payan  Patient and/or validated legal guardian concurs: yes  Clinical Substantiation: Pt comes into the ED due to increasing confusion, disorganized thinking and paranoia. Patient admits to ongoing meth use that has been quite consistent over the last few  "months which has led to the patient experiencing increasing paranoia and difficulty understanding reality. Patient last used meth 2 days ago and shares reports about having paranoia and fears around dandelions chasing him and general paranoia/fear around what other people's intensions are. He feels that he is unable to determine whether people in front of him are actual people or whether he is hallucinating. He's also been struggling with tactile halluciations which has resulted in him with severe skin picking. Pt recently completed a rule 25 with the hopes to getting into [facility] for treatment. Pt is in agreement with current plan. He denies SI, HI, NSSIB or panchito. Notes \"I don't know\" when asked about symptoms fo psychosis. He is minimal in his response noting high fatigue, and could be associated with substance withdrawal. He is open to discussion about admission to [facility] after follow up is completed by him with Kyleigh from [facility].    Legal Status: Legal Status at Admission: Voluntary/Patient has signed consent for treatment    Session Status: Time session started: 1030  Time session ended: 1035  Session Duration (minutes): 5 minutes  Session Number: 0  Anticipated number of sessions or this episode of care: 1    Session Start Time: 1030  Session Stop Time: 1035  CPT codes: Non-Billable  Time Spent: 5 minutes      CPT code(s) utilized: Non-Billable    Diagnosis:   Patient Active Problem List   Diagnosis Code    Agitation requiring sedation protocol R45.1    Agitation R45.1    Overdose of sympathomimetic agent, undetermined intent, initial encounter T44.904A    Suicidal ideation R45.851    Alcohol withdrawal syndrome without complication (H) F10.930    Fetal alcohol spectrum disorder (H28) P04.3    PTSD (post-traumatic stress disorder) F43.10    Unspecified alcohol use disorder F10.99    Unspecified nicotine use disorder F17.200    Major depressive disorder, recurrent episode, moderate (H) F33.1    MARCELL " (generalized anxiety disorder) F41.1    Cannabis use disorder, unspecified F12.90    Paranoia (H) F22    Methamphetamine dependence (H) F15.20    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

## 2024-05-14 NOTE — CONSULTS
Initial Psychiatric Consult   Consult date: May 14, 2024         Reason for Consult, requesting source:    Recs  Requesting source: Cierra Payan    Labs and imaging reviewed. Patient seen and evaluated by RANDOLPH Calix CNP          HPI:   Mani Grossman is a 24 year old male with history of polysubstance abuse, ODD, and FAS who presents to the ED seeking detox from meth. Patient reports using meth via snorting or eating for the last 4-5 months. Last use was Saturday.   He says he has been feeling paranoid and anxious over the past few weeks.  He was at [place] [facility] and they gave him zyprexa 5 mg at bedtime but he was too anxious to start it.     Received Zyprexa 10mg on 5/13. [facility] recently [facility] eval at [facility] and plans to go there. DEC assessment was completed 5/13 recommending observation status.     Today 5/14, he is fatigued but denies SI, HI, AVH. States his thoughts are less jumbled after Zyprexa, and agrees to continue this medication at bedtime.         Past Psychiatric History:   Anxiety Disorder, Substance Use Disorder, PTSD, MDD, Fetal alcohol syndrome         Substance Use and History:   Alcohol: history of AUD with an admission in 2019 for etoh detox  Benzodiazepines: None  Opiates: None  Cocaine: None  Marijuana: Occasional  Other Use: Methamphetamines (using meth daily)  Last Use:: 05/11/24     Pt reports around six previous CD treatments, with last treatment around August 2023.         Past Medical History:   PAST MEDICAL HISTORY:   Past Medical History:   Diagnosis Date    Anxiety     Depression     Drug abuse (H)     FAS (fetal alcohol syndrome)     Oppositional defiant disorder        PAST SURGICAL HISTORY: No past surgical history on file.          Family History:   FAMILY HISTORY: No family history on file.    Family Psychiatric History: unknown         Social History:   SOCIAL HISTORY:   Social History     Tobacco Use     "Smoking status: Never    Smokeless tobacco: Not on file   Substance Use Topics    Alcohol use: Yes     Alcohol/week: 0.0 standard drinks of alcohol     Comment: varied alcohol use                Physical ROS:   The 10 point Review of Systems is negative other than noted in the HPI or here.           Medications:     No current facility-administered medications for this encounter.              Allergies:   No Known Allergies       Labs:   No results found for this or any previous visit (from the past 48 hour(s)).       Physical and Psychiatric Examination:     /70   Pulse 77   Temp 97.6  F (36.4  C) (Oral)   Resp 18   SpO2 97%   Weight is 0 lbs 0 oz  There is no height or weight on file to calculate BMI.    Physical Exam:  I have reviewed the physical exam as documented by by the medical team and agree with findings and assessment and have no additional findings to add at this time.    Mental Status Exam:    Appearance: awake, alert  Attitude:  cooperative  Eye Contact:  fair  Mood:   \"tired\"  Affect:  intensity is blunted  Speech:  clear, coherent  Language: Fluent in english   Psychomotor Behavior:  no evidence of tardive dyskinesia, dystonia, or tics  Thought Process:  logical, linear, and goal oriented  Associations:  no loose associations  Thought Content:  no evidence of suicidal ideation or homicidal ideation and no evidence of psychotic thought  Insight:  partial  Judgement:  limited  Oriented to:  time, person, and place  Attention Span and Concentration:  fair  Recent and Remote Memory:  fair  Fund of Knowledge: Appropriate   Gait and Station: baseline                DSM-5 Diagnosis:   Methamphetamine induced psychosis   Methamphetamine use disorder           Assessment:   Mani is a 24 year old male with a history of the above diagnoses who presents with methamphetamine intoxication and psychosis. He willingly took 10mg of Zyprexa 5/13 and is tired today likely from combination of medications and " withdrawal of methamphetamine. He is motivated for treatment at [facility] and is not assessed to be an imminent danger to self or others, denies SI, HI, AVH and is able to have goal oriented conversation today. No bizarre behaviors noted in the ED today. He is agreeable to taking Zyprexa at bedtime to help his thinking.           Summary of Recommendations:     Agree with discharge to  treatment  Ordered Zyprexa 5mg PO at bedtime       Gisela Rowley, MANUEL-BC  Consult/Liaison Psychiatry   [facility]

## 2024-05-14 NOTE — DISCHARGE INSTRUCTIONS
Please follow up with [facility] for further care CALL [phone]. Kyleigh has been assigned to case management.

## 2024-05-14 NOTE — PLAN OF CARE
Mani Grossman  May 13, 2024  Plan of Care Hand-off Note     Patient Care Path: observation    Plan for Care:   Pt comes into the ED due to increasing confusion, disorganized thinking and paranoia.  Patient admits to ongoing meth use that has been quite consistent over the last few months which has led to the patient experiencing increasing paranoia and difficulty understanding reality.  Patient last used meth 2 days ago and shares reports about having paranoia and fears around dandelions chasing him and general paranoia/fear around what other people's intensions are. He feels that he is unable to determine whether people in front of him are actual people or whether he is hallucinating. He's also been struggling with tactile halluciations which has resulted in him with severe skin picking.     Pt recently completed a rule 25 with the hopes to getting into [facility] for treatment. Pt initially met with Dr. Tirado who discussed pt remaining in the hospital overnight for observation, starting Zyprexa medication to bring mental clearing, with hopes of being able to DC tomorrow to attend CD treatment. Pt is in agreement with current plan and will meet with extended care in the morning on 5/14 to determine pt's readiness to discharge to [facility].    Identified Goals and Safety Issues: Stabilize pt's parnaoia and improve orientation and thinking. Get in contact with [facility] for pt to begin CD treatment    Overview:  Arthur Grossman, Father,  [phone]            Legal Status:      Psychiatry Consult: consult in morning on 5/14       Updated   regarding plan of care.           Kendall Chisholm

## 2024-05-16 ENCOUNTER — HOSPITAL ENCOUNTER (EMERGENCY)
Facility: CLINIC | Age: 24
Discharge: HOME | End: 2024-05-16
Attending: EMERGENCY MEDICINE | Admitting: EMERGENCY MEDICINE
Payer: MEDICAID

## 2024-05-16 ENCOUNTER — PATIENT OUTREACH (OUTPATIENT)
Dept: CARE COORDINATION | Facility: CLINIC | Age: 24
End: 2024-05-16
Payer: MEDICAID

## 2024-05-16 VITALS
RESPIRATION RATE: 18 BRPM | HEART RATE: 79 BPM | OXYGEN SATURATION: 96 % | DIASTOLIC BLOOD PRESSURE: 60 MMHG | SYSTOLIC BLOOD PRESSURE: 106 MMHG | TEMPERATURE: 97.5 F

## 2024-05-16 DIAGNOSIS — F19.10 POLYSUBSTANCE ABUSE (H): ICD-10-CM

## 2024-05-16 DIAGNOSIS — R00.0 SINUS TACHYCARDIA: ICD-10-CM

## 2024-05-16 LAB
ALBUMIN SERPL BCG-MCNC: 3.9 G/DL (ref 3.5–5.2)
ALP SERPL-CCNC: 54 U/L (ref 40–150)
ALT SERPL W P-5'-P-CCNC: 22 U/L (ref 0–70)
ANION GAP SERPL CALCULATED.3IONS-SCNC: 13 MMOL/L (ref 7–15)
APAP SERPL-MCNC: <5 UG/ML (ref 10–30)
AST SERPL W P-5'-P-CCNC: 19 U/L (ref 0–45)
ATRIAL RATE - MUSE: 114 BPM
BASOPHILS # BLD AUTO: 0 10E3/UL (ref 0–0.2)
BASOPHILS NFR BLD AUTO: 0 %
BILIRUB SERPL-MCNC: 0.2 MG/DL
BUN SERPL-MCNC: 12.3 MG/DL (ref 6–20)
CALCIUM SERPL-MCNC: 8.4 MG/DL (ref 8.6–10)
CHLORIDE SERPL-SCNC: 104 MMOL/L (ref 98–107)
CREAT SERPL-MCNC: 1.05 MG/DL (ref 0.67–1.17)
DEPRECATED HCO3 PLAS-SCNC: 22 MMOL/L (ref 22–29)
DIASTOLIC BLOOD PRESSURE - MUSE: NORMAL MMHG
EGFRCR SERPLBLD CKD-EPI 2021: >90 ML/MIN/1.73M2
EOSINOPHIL # BLD AUTO: 0.2 10E3/UL (ref 0–0.7)
EOSINOPHIL NFR BLD AUTO: 3 %
ERYTHROCYTE [DISTWIDTH] IN BLOOD BY AUTOMATED COUNT: 12.9 % (ref 10–15)
ETHANOL SERPL-MCNC: <0.01 G/DL
GLUCOSE SERPL-MCNC: 165 MG/DL (ref 70–99)
HCT VFR BLD AUTO: 39.9 % (ref 40–53)
HGB BLD-MCNC: 14 G/DL (ref 13.3–17.7)
HOLD SPECIMEN: NORMAL
IMM GRANULOCYTES # BLD: 0 10E3/UL
IMM GRANULOCYTES NFR BLD: 1 %
INTERPRETATION ECG - MUSE: NORMAL
LYMPHOCYTES # BLD AUTO: 2.1 10E3/UL (ref 0.8–5.3)
LYMPHOCYTES NFR BLD AUTO: 31 %
MAGNESIUM SERPL-MCNC: 1.9 MG/DL (ref 1.7–2.3)
MCH RBC QN AUTO: 31 PG (ref 26.5–33)
MCHC RBC AUTO-ENTMCNC: 35.1 G/DL (ref 31.5–36.5)
MCV RBC AUTO: 89 FL (ref 78–100)
MONOCYTES # BLD AUTO: 0.5 10E3/UL (ref 0–1.3)
MONOCYTES NFR BLD AUTO: 7 %
NEUTROPHILS # BLD AUTO: 3.9 10E3/UL (ref 1.6–8.3)
NEUTROPHILS NFR BLD AUTO: 58 %
NRBC # BLD AUTO: 0 10E3/UL
NRBC BLD AUTO-RTO: 0 /100
P AXIS - MUSE: 69 DEGREES
PHOSPHATE SERPL-MCNC: 2.2 MG/DL (ref 2.5–4.5)
PLATELET # BLD AUTO: 269 10E3/UL (ref 150–450)
POTASSIUM SERPL-SCNC: 3.5 MMOL/L (ref 3.4–5.3)
PR INTERVAL - MUSE: 148 MS
PROT SERPL-MCNC: 5.9 G/DL (ref 6.4–8.3)
QRS DURATION - MUSE: 82 MS
QT - MUSE: 328 MS
QTC - MUSE: 452 MS
R AXIS - MUSE: 70 DEGREES
RBC # BLD AUTO: 4.51 10E6/UL (ref 4.4–5.9)
SALICYLATES SERPL-MCNC: <0.5 MG/DL
SODIUM SERPL-SCNC: 139 MMOL/L (ref 135–145)
SYSTOLIC BLOOD PRESSURE - MUSE: NORMAL MMHG
T AXIS - MUSE: 58 DEGREES
TSH SERPL DL<=0.005 MIU/L-ACNC: 1.85 UIU/ML (ref 0.3–4.2)
VENTRICULAR RATE- MUSE: 114 BPM
WBC # BLD AUTO: 6.6 10E3/UL (ref 4–11)

## 2024-05-16 PROCEDURE — 258N000003 HC RX IP 258 OP 636: Performed by: EMERGENCY MEDICINE

## 2024-05-16 PROCEDURE — 250N000013 HC RX MED GY IP 250 OP 250 PS 637: Performed by: EMERGENCY MEDICINE

## 2024-05-16 PROCEDURE — 84443 ASSAY THYROID STIM HORMONE: CPT | Performed by: EMERGENCY MEDICINE

## 2024-05-16 PROCEDURE — 250N000011 HC RX IP 250 OP 636: Performed by: EMERGENCY MEDICINE

## 2024-05-16 PROCEDURE — 83735 ASSAY OF MAGNESIUM: CPT | Performed by: EMERGENCY MEDICINE

## 2024-05-16 PROCEDURE — 99284 EMERGENCY DEPT VISIT MOD MDM: CPT | Mod: 25 | Performed by: EMERGENCY MEDICINE

## 2024-05-16 PROCEDURE — 82040 ASSAY OF SERUM ALBUMIN: CPT | Performed by: EMERGENCY MEDICINE

## 2024-05-16 PROCEDURE — 85025 COMPLETE CBC W/AUTO DIFF WBC: CPT | Performed by: EMERGENCY MEDICINE

## 2024-05-16 PROCEDURE — 96374 THER/PROPH/DIAG INJ IV PUSH: CPT | Performed by: EMERGENCY MEDICINE

## 2024-05-16 PROCEDURE — 93005 ELECTROCARDIOGRAM TRACING: CPT | Performed by: EMERGENCY MEDICINE

## 2024-05-16 PROCEDURE — 80143 DRUG ASSAY ACETAMINOPHEN: CPT | Performed by: EMERGENCY MEDICINE

## 2024-05-16 PROCEDURE — 80179 DRUG ASSAY SALICYLATE: CPT | Performed by: EMERGENCY MEDICINE

## 2024-05-16 PROCEDURE — 99284 EMERGENCY DEPT VISIT MOD MDM: CPT | Performed by: EMERGENCY MEDICINE

## 2024-05-16 PROCEDURE — 36415 COLL VENOUS BLD VENIPUNCTURE: CPT | Performed by: EMERGENCY MEDICINE

## 2024-05-16 PROCEDURE — 93010 ELECTROCARDIOGRAM REPORT: CPT | Performed by: EMERGENCY MEDICINE

## 2024-05-16 PROCEDURE — 96361 HYDRATE IV INFUSION ADD-ON: CPT | Performed by: EMERGENCY MEDICINE

## 2024-05-16 PROCEDURE — 84100 ASSAY OF PHOSPHORUS: CPT | Performed by: EMERGENCY MEDICINE

## 2024-05-16 PROCEDURE — 82077 ASSAY SPEC XCP UR&BREATH IA: CPT | Performed by: EMERGENCY MEDICINE

## 2024-05-16 RX ORDER — OLANZAPINE 5 MG/1
5 TABLET, ORALLY DISINTEGRATING ORAL ONCE
Status: COMPLETED | OUTPATIENT
Start: 2024-05-16 | End: 2024-05-16

## 2024-05-16 RX ORDER — LORAZEPAM 2 MG/ML
1 INJECTION INTRAMUSCULAR ONCE
Status: COMPLETED | OUTPATIENT
Start: 2024-05-16 | End: 2024-05-16

## 2024-05-16 RX ADMIN — LORAZEPAM 1 MG: 2 INJECTION INTRAMUSCULAR; INTRAVENOUS at 17:33

## 2024-05-16 RX ADMIN — OLANZAPINE 5 MG: 5 TABLET, ORALLY DISINTEGRATING ORAL at 17:33

## 2024-05-16 RX ADMIN — SODIUM CHLORIDE 1000 ML: 9 INJECTION, SOLUTION INTRAVENOUS at 17:39

## 2024-05-16 ASSESSMENT — ACTIVITIES OF DAILY LIVING (ADL)
ADLS_ACUITY_SCORE: 35

## 2024-05-16 NOTE — ED PROVIDER NOTES
[facility]ERGENCY DEPARTMENT ([facility])    5/16/24       ED PROVIDER NOTE    History     Chief Complaint   Patient presents with    Tachycardia     HPI  Mani Grossman is a 24 year old male with PMH notable for polysubstance use disorder (alcohol, prescription opioids, amphetamines, barbiturates, alcohol, psychedelics), MDD/MARCELL/PTSD, ADHD with history of SI who presents to the Emergency Department with tachycardia and palpitations.  Patient called EMS after smoking some sort of drugs tonight, felt heart palpitations, and paranoid, prompting him to call EMS.  EMS gave him 5 mg of droperidol and stated that he was initially tachycardic with a rate in the 160s.  Patient on arrival endorses palpitations and states that he wants to have drugs did not come unconscious.  He denies specific chest pain, shortness of breath, abdominal pain, nausea or vomiting.  He does endorse using multiple substances previously including methamphetamine.  He denies any alcohol use today or in the recent past.  Denies any new medications or withdrawal medications.  Patient denies suicidal ideations or attempts, smoked earlier to get high.  Denies hallucinations or psychosis.    I spoke to the patient's mother on the phone who stated that patient has a history of polysubstance abuse and is scheduled to start treatment tomorrow.  She had no additional concerns at this time.  Patient has not had recent fevers or infection symptoms.    Past Medical History  Past Medical History:   Diagnosis Date    Anxiety     Depression     Drug abuse (H)     FAS (fetal alcohol syndrome)     Oppositional defiant disorder      No past surgical history on file.  OLANZapine (ZYPREXA) 5 MG tablet  OLANZapine (ZYPREXA) 5 MG tablet      No Known Allergies  Family History  No family history on file.  Social History   Social History     Tobacco Use    Smoking status: Never   Substance Use Topics    Alcohol use: Yes     Alcohol/week: 0.0 standard drinks of  alcohol     Comment: varied alcohol use    Drug use: Yes     Types: LSD     Comment: last used 4/26/2020         A complete review of systems was performed with pertinent positives and negatives noted in the HPI, and all other systems negative.    Physical Exam   BP: 138/85  Pulse: 115  Temp: 97.5  F (36.4  C)  Resp: 18  SpO2: 98 %  Physical Exam  Physical Exam   Constitutional: Pt is oriented to person, place, and time.Pt appears well-developed and well-nourished.   HENT:   Head: Normocephalic and atraumatic.   Eyes: Conjunctivae are normal. Pupils are equal, round, and reactive to light.   Neck: Normal range of motion. Neck supple.   Cardiovascular: Normal rate, regular rhythm, normal heart sounds and intact distal pulses.    Pulmonary/Chest: Effort normal and breath sounds normal. No respiratory distress. Pt has no wheezes. Pt has no rales  Abdominal: Soft. Bowel sounds are normal. Pt exhibits no distension and no mass. No tenderness. Pt has no rebound and no guarding.   Musculoskeletal: Normal range of motion. Pt exhibits no edema.   Neurological: Pt is alert and oriented to person, place, and time. Normal reflexes.   Skin: Skin is warm and dry. No rash noted.   Psychiatric: Anxious mood and affect      ED Course, Procedures, & Data      Procedures            EKG Interpretation:      Interpreted by Tristian Govea MD  Time reviewed: 17:27  Symptoms at time of EKG: drug overdose, palpitations   Rhythm: normal sinus   Rate: Tachycardic (rate 114)  Axis: normal  Ectopy: none  Conduction: normal  ST Segments/ T Waves: No ST-T wave changes  Q Waves: none  Comparison to prior: Unchanged from 3/28/24 (though that one was more tachycardic)    Clinical Impression: sinus tachycardia, normal EKG          Results for orders placed or performed during the hospital encounter of 05/16/24   [facility] Draw     Status: None    Narrative    The following orders were created for panel order [facility] Draw.  Procedure                                Abnormality         Status                     ---------                               -----------         ------                     Extra Blue Top Tube[[id number]]                              Final result               Extra Red Top Tube[[id number]]                               Final result               Extra Green Top (Lithium...[[id number]]                      Final result               Extra Purple Top Tube[[id number]]                            Final result               Extra Green Top (Lithium...[[id number]]                      Final result                 Please view results for these tests on the individual orders.   Extra Blue Top Tube     Status: None   Result Value Ref Range    Hold Specimen [facility]    Extra Red Top Tube     Status: None   Result Value Ref Range    Hold Specimen [facility]    Extra Green Top (Lithium Heparin) Tube     Status: None   Result Value Ref Range    Hold Specimen [facility]    Extra Purple Top Tube     Status: None   Result Value Ref Range    Hold Specimen [facility]    Extra Green Top (Lithium Heparin) ON ICE     Status: None   Result Value Ref Range    Hold Specimen [facility]    Comprehensive metabolic panel     Status: Abnormal   Result Value Ref Range    Sodium 139 135 - 145 mmol/L    Potassium 3.5 3.4 - 5.3 mmol/L    Carbon Dioxide (CO2) 22 22 - 29 mmol/L    Anion Gap 13 7 - 15 mmol/L    Urea Nitrogen 12.3 6.0 - 20.0 mg/dL    Creatinine 1.05 0.67 - 1.17 mg/dL    GFR Estimate >90 >60 mL/min/1.73m2    Calcium 8.4 (L) 8.6 - 10.0 mg/dL    Chloride 104 98 - 107 mmol/L    Glucose 165 (H) 70 - 99 mg/dL    Alkaline Phosphatase 54 40 - 150 U/L    AST 19 0 - 45 U/L    ALT 22 0 - 70 U/L    Protein Total 5.9 (L) 6.4 - 8.3 g/dL    Albumin 3.9 3.5 - 5.2 g/dL    Bilirubin Total 0.2 <=1.2 mg/dL   Magnesium     Status: Normal   Result Value Ref Range    Magnesium 1.9 1.7 - 2.3 mg/dL   Phosphorus     Status: Abnormal   Result Value Ref Range    Phosphorus 2.2 (L) 2.5 - 4.5 mg/dL   Salicylate level      Status: Normal   Result Value Ref Range    Salicylate <0.5   mg/dL   Acetaminophen level     Status: Abnormal   Result Value Ref Range    Acetaminophen <5.0 (L) 10.0 - 30.0 ug/mL   TSH with free T4 reflex     Status: Normal   Result Value Ref Range    TSH 1.85 0.30 - 4.20 uIU/mL   Extra Tube     Status: None ()    Narrative    The following orders were created for panel order Extra Tube.  Procedure                               Abnormality         Status                     ---------                               -----------         ------                     Extra Green Top (Lithium...[[id number]]                                                   Please view results for these tests on the individual orders.   Extra Tube     Status: None    Narrative    The following orders were created for panel order Extra Tube.  Procedure                               Abnormality         Status                     ---------                               -----------         ------                     Extra Green Top (Lithium...[[id number]]                      Final result                 Please view results for these tests on the individual orders.   Extra Green Top (Lithium Heparin) Tube     Status: None   Result Value Ref Range    Hold Specimen [facility]    CBC with platelets and differential     Status: Abnormal   Result Value Ref Range    WBC Count 6.6 4.0 - 11.0 10e3/uL    RBC Count 4.51 4.40 - 5.90 10e6/uL    Hemoglobin 14.0 13.3 - 17.7 g/dL    Hematocrit 39.9 (L) 40.0 - 53.0 %    MCV 89 78 - 100 fL    MCH 31.0 26.5 - 33.0 pg    MCHC 35.1 31.5 - 36.5 g/dL    RDW 12.9 10.0 - 15.0 %    Platelet Count 269 150 - 450 10e3/uL    % Neutrophils 58 %    % Lymphocytes 31 %    % Monocytes 7 %    % Eosinophils 3 %    % Basophils 0 %    % Immature Granulocytes 1 %    NRBCs per 100 WBC 0 <1 /100    Absolute Neutrophils 3.9 1.6 - 8.3 10e3/uL    Absolute Lymphocytes 2.1 0.8 - 5.3 10e3/uL    Absolute Monocytes 0.5 0.0 - 1.3 10e3/uL    Absolute  Eosinophils 0.2 0.0 - 0.7 10e3/uL    Absolute Basophils 0.0 0.0 - 0.2 10e3/uL    Absolute Immature Granulocytes 0.0 <=0.4 10e3/uL    Absolute NRBCs 0.0 10e3/uL   Alcohol     Status: Normal   Result Value Ref Range    Alcohol ethyl <0.01 <=0.01 g/dL   EKG 12 lead     Status: None (Preliminary result)   Result Value Ref Range    Systolic Blood Pressure  mmHg    Diastolic Blood Pressure  mmHg    Ventricular Rate 114 BPM    Atrial Rate 114 BPM    MS Interval 148 ms    QRS Duration 82 ms     ms    QTc 452 ms    P Axis 69 degrees    R AXIS 70 degrees    T Axis 58 degrees    Interpretation ECG Sinus tachycardia  Otherwise normal ECG      CBC with Platelets & Differential     Status: Abnormal    Narrative    The following orders were created for panel order CBC with Platelets & Differential.  Procedure                               Abnormality         Status                     ---------                               -----------         ------                     CBC with platelets and d...[[id number]]  Abnormal            Final result                 Please view results for these tests on the individual orders.     Medications   LORazepam (ATIVAN) injection 1 mg (1 mg Intravenous $Given 5/16/24 1733)   OLANZapine zydis (zyPREXA) ODT tab 5 mg (5 mg Oral $Given 5/16/24 1733)   sodium chloride 0.9% BOLUS 1,000 mL (1,000 mLs Intravenous $New Bag 5/16/24 1739)     Labs Ordered and Resulted from Time of ED Arrival to Time of ED Departure   COMPREHENSIVE METABOLIC PANEL - Abnormal       Result Value    Sodium 139      Potassium 3.5      Carbon Dioxide (CO2) 22      Anion Gap 13      Urea Nitrogen 12.3      Creatinine 1.05      GFR Estimate >90      Calcium 8.4 (*)     Chloride 104      Glucose 165 (*)     Alkaline Phosphatase 54      AST 19      ALT 22      Protein Total 5.9 (*)     Albumin 3.9      Bilirubin Total 0.2     PHOSPHORUS - Abnormal    Phosphorus 2.2 (*)    ACETAMINOPHEN LEVEL - Abnormal    Acetaminophen <5.0 (*)     CBC WITH PLATELETS AND DIFFERENTIAL - Abnormal    WBC Count 6.6      RBC Count 4.51      Hemoglobin 14.0      Hematocrit 39.9 (*)     MCV 89      MCH 31.0      MCHC 35.1      RDW 12.9      Platelet Count 269      % Neutrophils 58      % Lymphocytes 31      % Monocytes 7      % Eosinophils 3      % Basophils 0      % Immature Granulocytes 1      NRBCs per 100 WBC 0      Absolute Neutrophils 3.9      Absolute Lymphocytes 2.1      Absolute Monocytes 0.5      Absolute Eosinophils 0.2      Absolute Basophils 0.0      Absolute Immature Granulocytes 0.0      Absolute NRBCs 0.0     MAGNESIUM - Normal    Magnesium 1.9     SALICYLATE LEVEL - Normal    Salicylate <0.5     TSH WITH FREE T4 REFLEX - Normal    TSH 1.85     ETHYL ALCOHOL LEVEL - Normal    Alcohol ethyl <0.01       No orders to display          Critical care was not performed.     Medical Decision Making  The patient's presentation was of moderate complexity (an undiagnosed new problem with uncertain diagnosis).    The patient's evaluation involved:  review of external note(s) from 1 sources (see separate area of note for details)  review of 3+ test result(s) ordered prior to this encounter (see separate area of note for details)  ordering and/or review of 3+ test(s) in this encounter (see separate area of note for details)    The patient's management necessitated high risk (a parenteral controlled substance).    Assessment & Plan    Mani Grossman is a 24 year old male with PMH notable for polysubstance use disorder (alcohol, prescription opioids, amphetamines, barbiturates, alcohol, psychedelics), MDD/MARCELL/PTSD, ADHD with history of SI who presents to the Emergency Department with tachycardia and palpitations in the setting of smoking drugs.  Patient arrives and has anxious mood and affect, is mildly tachycardic, has an EKG that shows sinus tachycardia with no ST segment abnormalities, and consistent with prior.  Lab work obtained including ingestion labs with  acetaminophen level, salicylate level, ethanol level, which came back unremarkable.  Patient was given IV Ativan as well as his home dose of olanzapine 5 mg sublingual, and he had improvement in heart rate to normal.  Other vital signs within normal limits.  Patient not suicidal, homicidal, or exhibiting signs of psychosis.  Overall reassuring evaluation here.  Patient monitored until improved and more clinically sober.  Discharged with outpatient follow-up and return precautions.    I have reviewed the nursing notes. I have reviewed the findings, diagnosis, plan and need for follow up with the patient.    New Prescriptions    No medications on file       Final diagnoses:   Polysubstance abuse (H)   Sinus tachycardia       Tristian Govea MD  [facility] EMERGENCY DEPARTMENT  5/16/2024        Tristian Govea MD  05/16/24 [id number]     18

## 2024-05-16 NOTE — ED TRIAGE NOTES
Pt BIBA from home c/o tachycardia 160s, palpitation, chest pain. .Smoked delta 9. EMS gave him Droperidol 5 mg. Hx; Polysubstance , anxiety ,depression , SI thoughts . Endorses SI without a plan . MD is aware.    /85   Pulse 119   Temp 97.5  F (36.4  C) (Oral)   Resp 18   SpO2 98%

## 2024-05-17 NOTE — ED NOTES
Pt up with steady gait, discharged and brought home with Father. No complaints at time of discharge

## 2024-05-17 NOTE — DISCHARGE INSTRUCTIONS
You had a reassuring evaluation in the emergency department today.  Please follow-up as planned with your outpatient physician and plans for rehab intake.  Return to emergency department if you have worsening emergent symptoms